# Patient Record
Sex: MALE | Race: ASIAN | URBAN - METROPOLITAN AREA
[De-identification: names, ages, dates, MRNs, and addresses within clinical notes are randomized per-mention and may not be internally consistent; named-entity substitution may affect disease eponyms.]

---

## 2017-05-05 ENCOUNTER — EMERGENCY (EMERGENCY)
Facility: HOSPITAL | Age: 22
LOS: 1 days | Discharge: HOME | End: 2017-05-05
Attending: EMERGENCY MEDICINE | Admitting: EMERGENCY MEDICINE
Payer: SELF-PAY

## 2017-05-05 VITALS
TEMPERATURE: 99 F | HEART RATE: 97 BPM | SYSTOLIC BLOOD PRESSURE: 124 MMHG | OXYGEN SATURATION: 98 % | DIASTOLIC BLOOD PRESSURE: 83 MMHG | RESPIRATION RATE: 16 BRPM

## 2017-05-05 DIAGNOSIS — K61.1 RECTAL ABSCESS: ICD-10-CM

## 2017-05-05 DIAGNOSIS — J06.9 ACUTE UPPER RESPIRATORY INFECTION, UNSPECIFIED: ICD-10-CM

## 2017-05-05 PROCEDURE — 99283 EMERGENCY DEPT VISIT LOW MDM: CPT | Mod: 25

## 2017-05-05 PROCEDURE — 46040 I&D ISCHIORCT&/PERIRCT ABSC: CPT

## 2017-05-05 RX ORDER — BROMPHENIRAMINE MALEATE, PSEUDOEPHEDRINE HYDROCHLORIDE, AND DEXTROMETHORPHAN HYDROBROMIDE 2; 10; 30 MG/5ML; MG/5ML; MG/5ML
5 SOLUTION ORAL
Qty: 150 | Refills: 0 | OUTPATIENT
Start: 2017-05-05

## 2017-05-05 RX ORDER — IBUPROFEN 200 MG
600 TABLET ORAL ONCE
Qty: 0 | Refills: 0 | Status: COMPLETED | OUTPATIENT
Start: 2017-05-05 | End: 2017-05-05

## 2017-05-05 RX ORDER — IBUPROFEN 200 MG
1 TABLET ORAL
Qty: 20 | Refills: 0 | OUTPATIENT
Start: 2017-05-05 | End: 2017-05-08

## 2017-05-05 RX ADMIN — Medication 600 MILLIGRAM(S): at 15:55

## 2017-05-05 RX ADMIN — Medication 100 MILLIGRAM(S): at 15:55

## 2017-05-05 NOTE — ED PROVIDER NOTE - CARE PLAN
Principal Discharge DX:	Perirectal abscess Principal Discharge DX:	Perirectal abscess  Secondary Diagnosis:	Upper respiratory infection

## 2017-05-05 NOTE — ED PROVIDER NOTE - GENITOURINARY, MLM
External genitalia is normal. no scrotal erythema, crepitus, or fluctuance, +3x4cm perirectal abscess to the R with fluctuance, induration and tenderness, no active bleeding or d/c

## 2017-05-05 NOTE — ED PROVIDER NOTE - OBJECTIVE STATEMENT
21 yo M with PMHx of rectal abscess presenting c/o abscess x 3d 21 yo M with PMHx of rectal abscess presenting c/o abscess x 3d.  Pt reports noticing some irritation and pain to the R perirectal region 3d ago, has been getting progressively worse with redness and swelling. Pain got worse today. Also notes getting over a URI with cough productive of clear sputum, congestion, and body aches.  Denies fever, chills, trauma, change in ROM/sensation/bowel movements, paresthesia, purulent d/c, N/V, HA, dizziness, LOC, CP, SOB, bleeding, and focal weakness

## 2017-05-05 NOTE — ED PROVIDER NOTE - MEDICAL DECISION MAKING DETAILS
pt with fluctuant tender abscess on exam, s/p I&D at bedside, adequate hemostasis achieved s/p I&D, wound thoroughly irrigated with NS, packed with sterile packing strips and dressed with DSD, wound care instructions provided, wound c&s obtained and sent, course of doxycycline, instructed to return in 2d for wound check or sooner for any s/s of worsening infxn. strict return precautions discussed, pt verbalized understanding.

## 2017-05-07 ENCOUNTER — EMERGENCY (EMERGENCY)
Facility: HOSPITAL | Age: 22
LOS: 1 days | Discharge: HOME | End: 2017-05-07
Attending: EMERGENCY MEDICINE | Admitting: EMERGENCY MEDICINE

## 2017-05-07 VITALS
SYSTOLIC BLOOD PRESSURE: 118 MMHG | RESPIRATION RATE: 18 BRPM | TEMPERATURE: 98 F | HEART RATE: 92 BPM | DIASTOLIC BLOOD PRESSURE: 78 MMHG

## 2017-05-07 VITALS
DIASTOLIC BLOOD PRESSURE: 83 MMHG | OXYGEN SATURATION: 98 % | SYSTOLIC BLOOD PRESSURE: 123 MMHG | RESPIRATION RATE: 17 BRPM | HEART RATE: 102 BPM | TEMPERATURE: 97 F

## 2017-05-07 DIAGNOSIS — Z79.1 LONG TERM (CURRENT) USE OF NON-STEROIDAL ANTI-INFLAMMATORIES (NSAID): ICD-10-CM

## 2017-05-07 DIAGNOSIS — Z79.2 LONG TERM (CURRENT) USE OF ANTIBIOTICS: ICD-10-CM

## 2017-05-07 DIAGNOSIS — Z48.01 ENCOUNTER FOR CHANGE OR REMOVAL OF SURGICAL WOUND DRESSING: ICD-10-CM

## 2017-05-07 DIAGNOSIS — Z79.899 OTHER LONG TERM (CURRENT) DRUG THERAPY: ICD-10-CM

## 2017-05-07 NOTE — ED PROVIDER NOTE - MEDICAL DECISION MAKING DETAILS
2 days sp I&D, bleeding/drainage yesterday and today, will replace wick, allow continue to drain, will likely be able to remove wick in 24-48 hours

## 2017-05-07 NOTE — ED PROVIDER NOTE - OBJECTIVE STATEMENT
22 yom pw wound check sp abscess I&D 2 days ago, on abx.  yesterday had bleeding.  no fc. 22 yom pw wound check sp abscess I&D 2 days ago, on abx.  yesterday had bleeding from the site.

## 2017-05-07 NOTE — ED PROVIDER NOTE - PHYSICAL EXAMINATION
GEN: ao x 3, nad, SKIN: I&D site well appaering, wick in place, no active drainage, no surrounding erythema GEN: ao x 3, nad, SKIN: I&D site well appearing, wick in place w/ blood residue, no surrounding erythema

## 2017-05-09 ENCOUNTER — EMERGENCY (EMERGENCY)
Facility: HOSPITAL | Age: 22
LOS: 1 days | Discharge: HOME | End: 2017-05-09
Attending: EMERGENCY MEDICINE | Admitting: EMERGENCY MEDICINE
Payer: SELF-PAY

## 2017-05-09 VITALS
RESPIRATION RATE: 18 BRPM | HEART RATE: 114 BPM | OXYGEN SATURATION: 100 % | DIASTOLIC BLOOD PRESSURE: 81 MMHG | TEMPERATURE: 98 F | SYSTOLIC BLOOD PRESSURE: 124 MMHG

## 2017-05-09 DIAGNOSIS — Z48.01 ENCOUNTER FOR CHANGE OR REMOVAL OF SURGICAL WOUND DRESSING: ICD-10-CM

## 2017-05-09 DIAGNOSIS — Z79.1 LONG TERM (CURRENT) USE OF NON-STEROIDAL ANTI-INFLAMMATORIES (NSAID): ICD-10-CM

## 2017-05-09 DIAGNOSIS — Z79.2 LONG TERM (CURRENT) USE OF ANTIBIOTICS: ICD-10-CM

## 2017-05-09 DIAGNOSIS — Z79.899 OTHER LONG TERM (CURRENT) DRUG THERAPY: ICD-10-CM

## 2017-05-09 DIAGNOSIS — L53.9 ERYTHEMATOUS CONDITION, UNSPECIFIED: ICD-10-CM

## 2017-05-09 PROCEDURE — 99282 EMERGENCY DEPT VISIT SF MDM: CPT

## 2017-05-09 NOTE — ED PROVIDER NOTE - OBJECTIVE STATEMENT
patient presents for wound check. s/p incision and drainage of abscess5/5/2017 of perirectal abscess. denies fever, chills, nightsweats. admits to compliance on medications. denies fever, chills, nightsweats.

## 2017-05-09 NOTE — ED PROVIDER NOTE - MEDICAL DECISION MAKING DETAILS
patient presents for wound check. wound healing appropriate. packing removed, wound repacked. advised warm soaks

## 2017-05-09 NOTE — ED PROVIDER NOTE - PHYSICAL EXAMINATION
packing removed from wound over R buttock, mild erythema around wound, dry, no drainage, no induration, no fluctuance

## 2017-05-10 LAB
CULTURE RESULTS: SIGNIFICANT CHANGE UP
SPECIMEN SOURCE: SIGNIFICANT CHANGE UP

## 2020-03-13 NOTE — ED ADULT NURSE NOTE - TOBACCO SCREENING (FROM THE ASSIST)
Total Number Of Lesions Treated: 3
Render In Bullet Format When Appropriate: No
Medical Necessity Information: It is in your best interest to select a reason for this procedure from the list below. All of these items fulfill various CMS LCD requirements except the new and changing color options.
Medical Necessity Clause: This procedure was medically necessary because the lesions that were treated were:
Duration Of Freeze Thaw-Cycle (Seconds): 15
Number Of Freeze-Thaw Cycles: 1 freeze-thaw cycle
Consent: The patient's consent was obtained including but not limited to risks of crusting, scabbing, blistering, scarring, darker or lighter pigmentary change, recurrence, incomplete removal and infection.
Detail Level: Simple
Render Post Care In The Note?: yes
Post-Care Instructions: I reviewed with the patient in detail post-care instructions. Patient is to wear sunprotection, and avoid picking at any of the treated lesions. Pt may apply Vaseline to crusted or scabbing areas.
Statement Selected

## 2020-07-28 NOTE — ED PROVIDER NOTE - CROS ED CARDIOVAS ALL NEG
Discharged to home per ambulatory in stable condition with written and verbal instructions. Patient verbalizes understanding of information given. negative...

## 2022-08-17 NOTE — ED ADULT NURSE NOTE - NS ED NURSE LEVEL OF CONSCIOUSNESS SPEECH
Addended by: ERIC KNIGHT on: 8/17/2022 11:25 AM     Modules accepted: Orders    
Speaking Coherently

## 2024-02-22 NOTE — ED PROCEDURE NOTE - CPROC ED SPECIMEN OBTAINED1
05/20/2022     05/20/2022     TSH:   Lab Results   Component Value Date    TSH 1.65 01/28/2020     Lab Results   Component Value Date    AST 15 09/21/2023    ALT 15 09/21/2023    BILITOT 0.4 09/21/2023    ALKPHOS 115 09/21/2023         All labs, medications and tests reviewed by myself including data and history from outside source , patient and available family .      1. Palpitations    2. Chest pain, unspecified type    3. Severe obesity (BMI 35.0-39.9) with comorbidity (HCC)    4. Essential hypertension    5. Dyslipidemia    6. Shortness of breath    7. Encounter for screening for abdominal aortic aneurysm (AAA) in patient 50 years of age or older without other risk factors for AAA         Impression and Plan:    Abdominal flutters ? Worried about AAA     Will obtain abdominal ultrasound to screen for AAA      Palpitations / PVCs:  EKG shows normal sinus rhythm.       48 hours Holter monitor 10/2020 - shows rare PVCs accounting for 1.75% total QRS.  On Atenolol 50 mg p.o. twice daily.  Tolerating well.   Could not tolerate metoprolol.     Chest pains -recurrent.  Non cardiac      Last episode was a week ago, reproducible to touch      Rare PVCs.   Stress test shows anterior wall perfusion defect,  Left heart Cath normal.  Risk Factor Modification.      Shortness of breath, improved: Patient has mild dyspnea on exertion - Echocardiogram is within normal limits.  Likely secondary to debility     Hyperlipidemia: Cont Lipitor 20 mg po daily      Essential hypertension: Better controlled. Cont Norvasc 5 mg daily /atenolol 50 mg daily.       Obesity BMI 37.87: Diet and Exercise - Low Fat Diet / Counseled about weight loss and exercise.          Return in about 3 months (around 5/22/2024).          Counseled extensively and medication compliance urged.  We discussed that for the  prevention of ASCVD our  goal is aggressive risk modification.Patient is encouraged to exercise even a brisk walk for 30 minutes  at  wound culture